# Patient Record
(demographics unavailable — no encounter records)

---

## 2025-06-30 NOTE — HISTORY OF PRESENT ILLNESS
[de-identified] : Maurice Santos presented with a cough that has persisted for a month, accompanied by mucus in the throat, which he attempts to expel through coughing. He reports no coughing episodes during the night. Although he has experienced significant improvement since the onset of his symptoms, he continues to feel congested when he coughs.

## 2025-07-14 NOTE — PHYSICAL EXAM
[General Appearance - Alert] : alert [General Appearance - Well Nourished] : well nourished [General Appearance - Well-Appearing] : well appearing [Auscultation Breath Sounds / Voice Sounds] : breath sounds clear to auscultation bilaterally [Respiration, Rhythm And Depth] : normal respiratory rhythm and effort [Chest Surgical / Traumatic Scar] : chest incision well healed [Heart Sounds] : normal S1 and S2 [Heart Sounds Click] : no clicks [Nail Clubbing] : no clubbing  or cyanosis of the fingernails

## 2025-07-14 NOTE — DISCUSSION/SUMMARY
[FreeTextEntry1] : In summary, Maurice Santos is a 29-year-old male with a PMHx of Maurice is a 29 yo man with D-transposition of the great arteries, ventricular septal defect and coarctation status post arterial switch with Tiffani maneuver, VSD patch closure and coarctation repair on 3/31/1996. He subsequently underwent balloon angioplasty of the proximal descending aorta at 4 months of age for recoarctation and s/p balloon dilation of the right pulmonary artery in September 2016. ECG NSR with bifasicular block.   Recent MRI shows Moderate to severely dilated neoaortic root, neoaortic regurgitation mild, 5% by PC flows). Severely dilated sinotubular junction. Mild plus neopulmonary regurgitation (RF: 25%). QpR:QpL 40%:60%. Mildly dilated LV (LVEDVi 117.2 ml/m2) with EF 45.8%. Mildly dilated RV (RVEDVi 124.2 ml/m2) with EF 46%.    PLAN: - Discontinue lisinopril at this time.  - Hold lisinopril for 48 hours, then transition to Entresto 24-26mg PO qbid  - If able to tolerate Entresto, will add Jardiance PO qday.  - Bloodwork with PMD provided, will scan into the chart.  - Check BP daily during the transition.  - Check CMP 1 week after starting Entresto.  - F/u in 6 mos with an Echocardiogram    SBE Prophylaxis We also discussed good dental hygiene with routine dental visits every six months. As per ACC guidelines, endocarditis prophylaxis is recommended in those undergoing dental procedures with prosthetic cardiac valves, unrepaired cyanotic heart disease including palliative shunts and conduits, repaired CHD with residual defects at site of adjacent to patch or prosthetic device, repaired CHD with prosthetic material or device during the first 6 months after the procedure and in those patients with a history of infective endocarditis. Amoxicillin 2gm or another appropriate antibiotic should be taken 30 to 60 minutes prior to the procedure.    Heart Healthy Lifestyle We discussed a heart-healthy lifestyle, including Mediterranean diet, weight management, and avoiding smoking/vaping and excessive alcohol. We also discussed exercising 30 minute a day.

## 2025-07-14 NOTE — CONSULT LETTER
[Today's Date] : [unfilled] [Name] : Name: [unfilled] [] : : ~~ [Today's Date:] : [unfilled] [Dear  ___:] : Dear Dr. [unfilled]: [Consult] : I had the pleasure of evaluating your patient, [unfilled]. My full evaluation follows. [Consult - Multiple Provider] : Thank you very much for allowing us to participate in the care of this patient. If you have any questions, please do not hesitate to contact us. [Sincerely,] : Sincerely, [FreeTextEntry4] : Analy Davey MD [FreeTextEntry5] : [address] [FreeTextEntry6] : [place] [address]

## 2025-07-14 NOTE — HISTORY OF PRESENT ILLNESS
[FreeTextEntry1] :  I had the pleasure of seeing BETINA PATTEN at the [facility].  As you well know, Mr. PATTEN is a 29 year man with a history of D-Transposition of the Great Arteries, Ventricular Septal Defect, and Coarctation of the Aorta.   Past Cardiac Surgical/Interventional Procedures: 1) 03/31/1996, Arterial Switch with Tiffani maneuver, VSD patch closure, and coarctation repair, Dr. Landaverde, [facility] 2) 1996, Cath, Balloon angioplasty of proximal descending aorta for recoarctation at 4 mos of age 3) 09/15/2016, [place], [place] [facility] [place]  Betina is here today, 07/14/2025, with his mother.  He recently had a cold with some chest congestion, but it is improving already.  He has no complaints, but unable to obtain full ROS at this time.   He  denies chest pain, shortness of breath, palpitations, cough,dizziness, syncope, orthopnea, paroxysmal nocturnal dyspnea, abdominal swelling or lower extremity swelling. He can climb a flight of stairs without difficulty.   PMHx: - Developmental Delay - Pneumonia - HTN - Asthma  Other PSHx: - Eye Surgery  Family Hx: - Mother- HTN - Brother- Autoimmune Disease  Social Hx: - Lives at home with his mom.  - He exercises on a regular basis by walking with his mom.  - He goes to the dentist on a regular basis.

## 2025-07-14 NOTE — REVIEW OF SYSTEMS
[FreeTextEntry1] : Mom reports feeling well. Congested as of recent- Seeing PCP tomorrow for f/u on congestion.

## 2025-07-14 NOTE — CARDIOLOGY SUMMARY
[Today's Date] : [unfilled] [FreeTextEntry1] : NSR, LAD, RBBB, LVH [de-identified] : 07/01/2024 [FreeTextEntry2] : Summary: 1. S-Atrial situs solitus; D-Ventricular loop; D-Transposition of the great arteries. 2. Moderate yokasta-aortic valve regurgitation (VC = 0.5 cm, pressure 1/2 time - 248 msec, evidence of LV dilation). On limited imaging, the right coronary cusp appears to have possibly restricted movement and may be contributing to the aortic regurgitation. 3. No residual ventricular septal defect. 4. Mildly dilated left ventricle with mildly decreased systolic function. 5. Qualitatively normal right ventricular systolic function. 6. No residual coarctation. 7. Poor 2D and color Doppler imaging of the main and branch pulmonary arteries due to poor acoustic windows. Inadequate to be able to assess for stenosis. 8. Severely dilated aortic root. 9. Severely dilated ascending aorta. 10. No pericardial effusion. [de-identified] : 07/01/2024 [de-identified] : Final Interpretation 1. The predominant rhythm is sinus rhythm with an average heart rate of 82.  2. Bundle branch block.  3. Rare PACs and PVCs.  4. No symptoms reported.   [de-identified] : 02/13/2025 [de-identified] : IMPRESSION:  D- transposition of great arteries, ventricular septal defect and coarctation status post repair.  No residual ventricular septal defect.  Tricommissural neoaortic valve. Moderate to severely dilated neoaortic root. There is lack of coaptation of the valve leaflets centrally. In the two prior studies , the degree of neoaortic regurgitation was 25%. On this study, there appears to be mild yokasta aortic regurgitation (RF: 5% by PC flows). There appears to be mild neoaortic regurgitation on cine imaging.  Severely dilated sinotubular junction. No evidence of stenosis. Mildly dilated ascending aorta. Normal distal transverse arch. There is mild narrowing at the coarctation repair site (1.3 cm) with a slight increase in caliber of the proximal thoracic descending aorta (1.9 cm) - unchanged from prior. .  Mild plus neopulmonary regurgitation (RF:25%). Mildly dilated left pulmonary artery. Mild narrowing of the proximal right pulmonary artery as it courses between the ascending aorta and the SVC. Estimated QpR: QpL is 40%: 60%. No significant change from prior study of 2022.  Mildly dilated left ventricle (LVEDVi: 117.2mL/m2, z: +2.37) with moderately decreased ejection fraction (LVEF: 45.8%; z: -4.0). Mildly dilated right ventricle (RVEDVi: 124.2mL/m2; z: +2.69) with mildly decreased ejection fraction (RVEF: 46 %, z: -2.45). As compared to prior study there is mild dilation of both the ventricles.  Patent origin of the reimplanted right coronary artery from above the sinotubular junction (anterior and rightwards). The origin of left coronary artery is not adequately visualized on this study or study of 2022. Was seen on prior study of 1/3/2019  --- End of Report ---

## 2025-07-14 NOTE — REASON FOR VISIT
[Follow-Up] : a follow-up visit for [S/P Cardiac Surgery] : status post cardiac surgery [Patient] : patient [Mother] : mother [Medical Records] : medical records [FreeTextEntry3] : Here for f/u.

## 2025-07-14 NOTE — CARDIOLOGY SUMMARY
[Today's Date] : [unfilled] [FreeTextEntry1] : NSR, LAD, RBBB, LVH [de-identified] : 07/01/2024 [FreeTextEntry2] : Summary: 1. S-Atrial situs solitus; D-Ventricular loop; D-Transposition of the great arteries. 2. Moderate yokasta-aortic valve regurgitation (VC = 0.5 cm, pressure 1/2 time - 248 msec, evidence of LV dilation). On limited imaging, the right coronary cusp appears to have possibly restricted movement and may be contributing to the aortic regurgitation. 3. No residual ventricular septal defect. 4. Mildly dilated left ventricle with mildly decreased systolic function. 5. Qualitatively normal right ventricular systolic function. 6. No residual coarctation. 7. Poor 2D and color Doppler imaging of the main and branch pulmonary arteries due to poor acoustic windows. Inadequate to be able to assess for stenosis. 8. Severely dilated aortic root. 9. Severely dilated ascending aorta. 10. No pericardial effusion. [de-identified] : 07/01/2024 [de-identified] : Final Interpretation 1. The predominant rhythm is sinus rhythm with an average heart rate of 82.  2. Bundle branch block.  3. Rare PACs and PVCs.  4. No symptoms reported.   [de-identified] : 02/13/2025 [de-identified] : IMPRESSION:  D- transposition of great arteries, ventricular septal defect and coarctation status post repair.  No residual ventricular septal defect.  Tricommissural neoaortic valve. Moderate to severely dilated neoaortic root. There is lack of coaptation of the valve leaflets centrally. In the two prior studies , the degree of neoaortic regurgitation was 25%. On this study, there appears to be mild yokasta aortic regurgitation (RF: 5% by PC flows). There appears to be mild neoaortic regurgitation on cine imaging.  Severely dilated sinotubular junction. No evidence of stenosis. Mildly dilated ascending aorta. Normal distal transverse arch. There is mild narrowing at the coarctation repair site (1.3 cm) with a slight increase in caliber of the proximal thoracic descending aorta (1.9 cm) - unchanged from prior. .  Mild plus neopulmonary regurgitation (RF:25%). Mildly dilated left pulmonary artery. Mild narrowing of the proximal right pulmonary artery as it courses between the ascending aorta and the SVC. Estimated QpR: QpL is 40%: 60%. No significant change from prior study of 2022.  Mildly dilated left ventricle (LVEDVi: 117.2mL/m2, z: +2.37) with moderately decreased ejection fraction (LVEF: 45.8%; z: -4.0). Mildly dilated right ventricle (RVEDVi: 124.2mL/m2; z: +2.69) with mildly decreased ejection fraction (RVEF: 46 %, z: -2.45). As compared to prior study there is mild dilation of both the ventricles.  Patent origin of the reimplanted right coronary artery from above the sinotubular junction (anterior and rightwards). The origin of left coronary artery is not adequately visualized on this study or study of 2022. Was seen on prior study of 1/3/2019  --- End of Report ---

## 2025-07-14 NOTE — CONSULT LETTER
[Today's Date] : [unfilled] [Name] : Name: [unfilled] [] : : ~~ [Today's Date:] : [unfilled] [Dear  ___:] : Dear Dr. [unfilled]: [Consult] : I had the pleasure of evaluating your patient, [unfilled]. My full evaluation follows. [Consult - Multiple Provider] : Thank you very much for allowing us to participate in the care of this patient. If you have any questions, please do not hesitate to contact us. [Sincerely,] : Sincerely, [FreeTextEntry4] : Anlay Davey MD [FreeTextEntry5] : [address] [FreeTextEntry6] : [place] [address]

## 2025-07-14 NOTE — HISTORY OF PRESENT ILLNESS
[FreeTextEntry1] :  I had the pleasure of seeing BETINA PATTEN at the [facility].  As you well know, Mr. PATTEN is a 29 year man with a history of D-Transposition of the Great Arteries, Ventricular Septal Defect, and Coarctation of the Aorta.   Past Cardiac Surgical/Interventional Procedures: 1) 03/31/1996, Arterial Switch with Tiffani maneuver, VSD patch closure, and coarctation repair, Dr. Landaverde, [facility] 2) 1996, Cath, Balloon angioplasty of proximal descending aorta for recoarctation at 4 mos of age 3) 09/15/2016, Cath, Balloon dilation of RPA, Dr. Davalos, [facility] [place]  Betina is here today, 07/14/2025, with his mother.  He recently had a cold with some chest congestion, but it is improving already.  He has no complaints, but unable to obtain full ROS at this time.   He  denies chest pain, shortness of breath, palpitations, cough,dizziness, syncope, orthopnea, paroxysmal nocturnal dyspnea, abdominal swelling or lower extremity swelling. He can climb a flight of stairs without difficulty.   PMHx: - Developmental Delay - Pneumonia - HTN - Asthma  Other PSHx: - Eye Surgery  Family Hx: - Mother- HTN - Brother- Autoimmune Disease  Social Hx: - Lives at home with his mom.  - He exercises on a regular basis by walking with his mom.  - He goes to the dentist on a regular basis.

## 2025-07-15 NOTE — IMPRESSION
[Spirometry] : Spirometry [Normal Spirometry] : spirometry normal [FreeTextEntry1] : FEV1 IS 68% OF PRED

## 2025-07-15 NOTE — ASSESSMENT
[FreeTextEntry1] : Sent Maurice for a Chest X-Ray  F/U within 2 weeks  Clarithromycin 250 MG/5ML Oral Suspension Reconstituted; TAKE 10 ML Twice daily x 14 days. Follow-up in 2 weeks

## 2025-07-15 NOTE — HISTORY OF PRESENT ILLNESS
[0 x/month] : 0 x/month [None] : None [< or = 2 days/wk] : < than or = 2 days/week [de-identified] : Maurice Santos came in to follow up on his cough. He has been using his nebulizer machine for about a week, and while his condition has improved, it is not completely resolved. Additionally, he is snoring during his sleep. His mother mentioned that he had pneumonia a couple of years ago, which required a hospital visit, and Maurice believes he may be experiencing symptoms of it again.

## 2025-07-15 NOTE — PHYSICAL EXAM
[Alert] : alert [Well Nourished] : well nourished [Healthy Appearance] : healthy appearance [No Acute Distress] : no acute distress [Well Developed] : well developed [Normal Pupil & Iris Size/Symmetry] : normal pupil and iris size and symmetry [No Discharge] : no discharge [No Photophobia] : no photophobia [Sclera Not Icteric] : sclera not icteric [Normal TMs] : both tympanic membranes were normal [Normal Nasal Mucosa] : the nasal mucosa was normal [Normal Lips/Tongue] : the lips and tongue were normal [Normal Outer Ear/Nose] : the ears and nose were normal in appearance [Normal Tonsils] : normal tonsils [No Thrush] : no thrush [Pale mucosa] : pale mucosa [Supple] : the neck was supple [Normal Rate and Effort] : normal respiratory rhythm and effort [No Retractions] : no retractions [Bilateral Audible Breath Sounds] : bilateral audible breath sounds [Normal Rate] : heart rate was normal  [Normal S1, S2] : normal S1 and S2 [No murmur] : no murmur [Regular Rhythm] : with a regular rhythm [Soft] : abdomen soft [Not Tender] : non-tender [Not Distended] : not distended [No HSM] : no hepato-splenomegaly [Normal Cervical Lymph Nodes] : cervical [Skin Intact] : skin intact  [No Rash] : no rash [No Skin Lesions] : no skin lesions [No clubbing] : no clubbing [No Edema] : no edema [No Cyanosis] : no cyanosis [Normal Mood] : mood was normal [Normal Affect] : affect was normal [Alert, Awake, Oriented as Age-Appropriate] : alert, awake, oriented as age appropriate [Wheezing] : no wheezing was heard [de-identified] : Rales present in his left lower base.

## 2025-07-29 NOTE — HISTORY OF PRESENT ILLNESS
[de-identified] : Maurice Santos came in for a follow-up regarding his cough. He reported that he is feeling better, no longer experiences coughing, and is able to sleep through the night without any issues.

## 2025-07-29 NOTE — HISTORY OF PRESENT ILLNESS
[de-identified] : Maurice Santos came in for a follow-up regarding his cough. He reported that he is feeling better, no longer experiences coughing, and is able to sleep through the night without any issues.